# Patient Record
Sex: FEMALE | Race: WHITE | NOT HISPANIC OR LATINO | URBAN - METROPOLITAN AREA
[De-identification: names, ages, dates, MRNs, and addresses within clinical notes are randomized per-mention and may not be internally consistent; named-entity substitution may affect disease eponyms.]

---

## 2018-06-15 ENCOUNTER — OUTPATIENT (OUTPATIENT)
Dept: OUTPATIENT SERVICES | Facility: HOSPITAL | Age: 58
LOS: 1 days | Discharge: HOME | End: 2018-06-15

## 2018-06-15 VITALS
OXYGEN SATURATION: 100 % | SYSTOLIC BLOOD PRESSURE: 121 MMHG | DIASTOLIC BLOOD PRESSURE: 66 MMHG | RESPIRATION RATE: 16 BRPM | TEMPERATURE: 98 F | HEIGHT: 63 IN | WEIGHT: 128.97 LBS | HEART RATE: 73 BPM

## 2018-06-15 DIAGNOSIS — Z01.818 ENCOUNTER FOR OTHER PREPROCEDURAL EXAMINATION: ICD-10-CM

## 2018-06-15 DIAGNOSIS — Z98.890 OTHER SPECIFIED POSTPROCEDURAL STATES: Chronic | ICD-10-CM

## 2018-06-15 DIAGNOSIS — L60.0 INGROWING NAIL: ICD-10-CM

## 2018-06-15 LAB
ALBUMIN SERPL ELPH-MCNC: 4.2 G/DL — SIGNIFICANT CHANGE UP (ref 3.5–5.2)
ALP SERPL-CCNC: 42 U/L — SIGNIFICANT CHANGE UP (ref 30–115)
ALT FLD-CCNC: 14 U/L — SIGNIFICANT CHANGE UP (ref 0–41)
ANION GAP SERPL CALC-SCNC: 13 MMOL/L — SIGNIFICANT CHANGE UP (ref 7–14)
AST SERPL-CCNC: 17 U/L — SIGNIFICANT CHANGE UP (ref 0–41)
BASOPHILS # BLD AUTO: 0.02 K/UL — SIGNIFICANT CHANGE UP (ref 0–0.2)
BASOPHILS NFR BLD AUTO: 0.3 % — SIGNIFICANT CHANGE UP (ref 0–1)
BILIRUB SERPL-MCNC: 0.3 MG/DL — SIGNIFICANT CHANGE UP (ref 0.2–1.2)
BUN SERPL-MCNC: 16 MG/DL — SIGNIFICANT CHANGE UP (ref 10–20)
CALCIUM SERPL-MCNC: 8.6 MG/DL — SIGNIFICANT CHANGE UP (ref 8.5–10.1)
CHLORIDE SERPL-SCNC: 98 MMOL/L — SIGNIFICANT CHANGE UP (ref 98–110)
CO2 SERPL-SCNC: 26 MMOL/L — SIGNIFICANT CHANGE UP (ref 17–32)
CREAT SERPL-MCNC: 0.7 MG/DL — SIGNIFICANT CHANGE UP (ref 0.7–1.5)
EOSINOPHIL # BLD AUTO: 0.02 K/UL — SIGNIFICANT CHANGE UP (ref 0–0.7)
EOSINOPHIL NFR BLD AUTO: 0.3 % — SIGNIFICANT CHANGE UP (ref 0–8)
GLUCOSE SERPL-MCNC: 85 MG/DL — SIGNIFICANT CHANGE UP (ref 70–99)
HCT VFR BLD CALC: 40.2 % — SIGNIFICANT CHANGE UP (ref 37–47)
HGB BLD-MCNC: 13.6 G/DL — SIGNIFICANT CHANGE UP (ref 12–16)
IMM GRANULOCYTES NFR BLD AUTO: 0.3 % — SIGNIFICANT CHANGE UP (ref 0.1–0.3)
LYMPHOCYTES # BLD AUTO: 1.91 K/UL — SIGNIFICANT CHANGE UP (ref 1.2–3.4)
LYMPHOCYTES # BLD AUTO: 27.8 % — SIGNIFICANT CHANGE UP (ref 20.5–51.1)
MCHC RBC-ENTMCNC: 31.1 PG — HIGH (ref 27–31)
MCHC RBC-ENTMCNC: 33.8 G/DL — SIGNIFICANT CHANGE UP (ref 32–37)
MCV RBC AUTO: 92 FL — SIGNIFICANT CHANGE UP (ref 81–99)
MONOCYTES # BLD AUTO: 0.58 K/UL — SIGNIFICANT CHANGE UP (ref 0.1–0.6)
MONOCYTES NFR BLD AUTO: 8.5 % — SIGNIFICANT CHANGE UP (ref 1.7–9.3)
NEUTROPHILS # BLD AUTO: 4.31 K/UL — SIGNIFICANT CHANGE UP (ref 1.4–6.5)
NEUTROPHILS NFR BLD AUTO: 62.8 % — SIGNIFICANT CHANGE UP (ref 42.2–75.2)
NRBC # BLD: 0 /100 WBCS — SIGNIFICANT CHANGE UP (ref 0–0)
PLATELET # BLD AUTO: 173 K/UL — SIGNIFICANT CHANGE UP (ref 130–400)
POTASSIUM SERPL-MCNC: 5.1 MMOL/L — HIGH (ref 3.5–5)
POTASSIUM SERPL-SCNC: 5.1 MMOL/L — HIGH (ref 3.5–5)
PROT SERPL-MCNC: 6.3 G/DL — SIGNIFICANT CHANGE UP (ref 6–8)
RBC # BLD: 4.37 M/UL — SIGNIFICANT CHANGE UP (ref 4.2–5.4)
RBC # FLD: 12.6 % — SIGNIFICANT CHANGE UP (ref 11.5–14.5)
SODIUM SERPL-SCNC: 137 MMOL/L — SIGNIFICANT CHANGE UP (ref 135–146)
WBC # BLD: 6.86 K/UL — SIGNIFICANT CHANGE UP (ref 4.8–10.8)
WBC # FLD AUTO: 6.86 K/UL — SIGNIFICANT CHANGE UP (ref 4.8–10.8)

## 2018-06-15 NOTE — H&P PST ADULT - PMH
Anxiety    Bipolar disorder    Depression  no suicide ideation Anxiety    Arthritis  OA  Bipolar disorder    Depression  no suicide ideation

## 2018-06-15 NOTE — H&P PST ADULT - REASON FOR ADMISSION
painful ingrown toe nails bilateral hallux--pt has for many years--pt requires surgical intervention  pt denies current cp,sob,palp,cough,dysuria   can walk 2 fos and many ,many blocks without sob  pt states this is complete med/surg history

## 2018-06-18 DIAGNOSIS — F41.9 ANXIETY DISORDER, UNSPECIFIED: ICD-10-CM

## 2018-06-18 DIAGNOSIS — F31.9 BIPOLAR DISORDER, UNSPECIFIED: ICD-10-CM

## 2018-06-18 DIAGNOSIS — F32.9 MAJOR DEPRESSIVE DISORDER, SINGLE EPISODE, UNSPECIFIED: ICD-10-CM

## 2018-06-28 ENCOUNTER — OUTPATIENT (OUTPATIENT)
Dept: OUTPATIENT SERVICES | Facility: HOSPITAL | Age: 58
LOS: 1 days | Discharge: HOME | End: 2018-06-28

## 2018-06-28 ENCOUNTER — RESULT REVIEW (OUTPATIENT)
Age: 58
End: 2018-06-28

## 2018-06-28 VITALS
OXYGEN SATURATION: 100 % | DIASTOLIC BLOOD PRESSURE: 57 MMHG | RESPIRATION RATE: 17 BRPM | SYSTOLIC BLOOD PRESSURE: 112 MMHG | HEART RATE: 62 BPM

## 2018-06-28 VITALS
HEIGHT: 63 IN | SYSTOLIC BLOOD PRESSURE: 113 MMHG | DIASTOLIC BLOOD PRESSURE: 58 MMHG | HEART RATE: 64 BPM | TEMPERATURE: 98 F | OXYGEN SATURATION: 100 % | RESPIRATION RATE: 18 BRPM | WEIGHT: 128.97 LBS

## 2018-06-28 DIAGNOSIS — M89.8X7 OTHER SPECIFIED DISORDERS OF BONE, ANKLE AND FOOT: ICD-10-CM

## 2018-06-28 DIAGNOSIS — Z98.890 OTHER SPECIFIED POSTPROCEDURAL STATES: Chronic | ICD-10-CM

## 2018-06-28 DIAGNOSIS — F31.9 BIPOLAR DISORDER, UNSPECIFIED: ICD-10-CM

## 2018-06-28 DIAGNOSIS — L60.0 INGROWING NAIL: ICD-10-CM

## 2018-06-28 DIAGNOSIS — F41.8 OTHER SPECIFIED ANXIETY DISORDERS: ICD-10-CM

## 2018-06-28 RX ORDER — OSPEMIFENE 60 MG/1
1 TABLET, FILM COATED ORAL
Qty: 0 | Refills: 0 | COMMUNITY

## 2018-06-28 RX ORDER — MORPHINE SULFATE 50 MG/1
2 CAPSULE, EXTENDED RELEASE ORAL
Qty: 0 | Refills: 0 | Status: DISCONTINUED | OUTPATIENT
Start: 2018-06-28 | End: 2018-06-28

## 2018-06-28 RX ORDER — DIVALPROEX SODIUM 500 MG/1
1250 TABLET, DELAYED RELEASE ORAL
Qty: 0 | Refills: 0 | COMMUNITY

## 2018-06-28 RX ORDER — L.ACIDOPH/B.ANIMALIS/B.LONGUM 15B CELL
1 CAPSULE ORAL
Qty: 0 | Refills: 0 | COMMUNITY

## 2018-06-28 RX ORDER — LURASIDONE HYDROCHLORIDE 40 MG/1
1 TABLET ORAL
Qty: 0 | Refills: 0 | COMMUNITY

## 2018-06-28 RX ORDER — OXYCODONE AND ACETAMINOPHEN 5; 325 MG/1; MG/1
1 TABLET ORAL ONCE
Qty: 0 | Refills: 0 | Status: DISCONTINUED | OUTPATIENT
Start: 2018-06-28 | End: 2018-06-28

## 2018-06-28 RX ORDER — SODIUM CHLORIDE 9 MG/ML
1000 INJECTION, SOLUTION INTRAVENOUS
Qty: 0 | Refills: 0 | Status: DISCONTINUED | OUTPATIENT
Start: 2018-06-28 | End: 2018-07-13

## 2018-06-28 RX ORDER — CELECOXIB 200 MG/1
1 CAPSULE ORAL
Qty: 0 | Refills: 0 | COMMUNITY

## 2018-06-28 RX ORDER — LINACLOTIDE 145 UG/1
1 CAPSULE, GELATIN COATED ORAL
Qty: 0 | Refills: 0 | COMMUNITY

## 2018-06-28 RX ORDER — CLONAZEPAM 1 MG
1.5 TABLET ORAL
Qty: 0 | Refills: 0 | COMMUNITY

## 2018-06-28 RX ORDER — ONDANSETRON 8 MG/1
4 TABLET, FILM COATED ORAL ONCE
Qty: 0 | Refills: 0 | Status: DISCONTINUED | OUTPATIENT
Start: 2018-06-28 | End: 2018-07-13

## 2018-06-28 RX ADMIN — OXYCODONE AND ACETAMINOPHEN 1 TABLET(S): 5; 325 TABLET ORAL at 09:20

## 2018-06-28 RX ADMIN — SODIUM CHLORIDE 100 MILLILITER(S): 9 INJECTION, SOLUTION INTRAVENOUS at 08:45

## 2018-06-28 NOTE — PROGRESS NOTE ADULT - SUBJECTIVE AND OBJECTIVE BOX
Podiatry Pre-Operative Note   [id number] DOV KILLIAN is a 58y year old Female patient presenting to the operating room for surgical management of the b/l feet. The patient has failed all conservative measures and requires surgical intervention at this time.    PAST MEDICAL & SURGICAL HISTORY:  Arthritis: OA  Bipolar disorder  Depression: no suicide ideation  Anxiety  History of surgery: foot surgery age 39&#x27;s      Medications:     CeleBREX 200 mg oral capsule: 1 cap(s) orally 2 times a day, As Needed  clonazePAM: 1.5 milligram(s) orally once a day (at bedtime)  Depakote: 1250 milligram(s) orally once a day (at bedtime)  Latuda 20 mg oral tablet: 1 tab(s) orally once a day (at bedtime)  Linzess 145 mcg oral capsule: 1 cap(s) orally once a day  multivitamin: orally once a day, 1 tab  Osphena 60 mg oral tablet: 1 tab(s) orally once a day  Probiotic Formula oral capsule: 1 cap(s) orally once a day    Allergies    penicillin (Other)    Intolerances        Consent [done]  H&P [done]  Medical Clearance [PAST_]    Surgeon: Dr. Husain DPM  Assistant (s): Dr. Daniel SCHULTZ  Pre Operative Diagnosis: Painful ingrown 1st toenail b/l  Planned Procedure: Nail avulsion of the 1st toenail b/l with matrixectomy.    The patient has been educated on the above procedure, with all risks and benefits described. No guarantees were given or implied for the aforementioned procedure.  The above assistant(s) have introduced themselves to the patient. The patient consents to their participation in the procedure listed above.   06-28-18 @ 07:14

## 2018-06-28 NOTE — PRE-ANESTHESIA EVALUATION ADULT - NSANTHOSAYNRD_GEN_A_CORE
see tool/No. LAINEY screening performed.  STOP BANG Legend: 0-2 = LOW Risk; 3-4 = INTERMEDIATE Risk; 5-8 = HIGH Risk

## 2018-06-28 NOTE — BRIEF OPERATIVE NOTE - PRE-OP DX
Ingrown left big toenail  06/28/2018  painful left hallux with ingrown nail with exostosis  Active  Ed Sanchez  Ingrown right big toenail  06/28/2018  painful right hallux with ingrown nail with exostosis  Active  Ed Sanchez

## 2018-06-28 NOTE — BRIEF OPERATIVE NOTE - PROCEDURE
<<-----Click on this checkbox to enter Procedure Excision, nail and nail matrix, permanent, for ingrown nail  06/28/2018  painful b/l hallux with chronic ingrown including cutting soft tissue and bone, bilateral feet  Active  [id number]

## 2018-06-28 NOTE — BRIEF OPERATIVE NOTE - POST-OP DX
Ingrown left big toenail  06/28/2018  painful left hallux with ingrown toenails with exostosis of matrixectomies, soft tisse and bone  Active  Ed Sanchez  Ingrown right big toenail  06/28/2018  painful right hallux with ingrown toenails with exostosis of matrixectomies, soft tisse and bone  Active  Ed Sanchez

## 2018-06-29 LAB — SURGICAL PATHOLOGY STUDY: SIGNIFICANT CHANGE UP

## 2018-07-17 PROBLEM — F32.9 MAJOR DEPRESSIVE DISORDER, SINGLE EPISODE, UNSPECIFIED: Chronic | Status: ACTIVE | Noted: 2018-06-15

## 2022-07-05 PROBLEM — F41.9 ANXIETY DISORDER, UNSPECIFIED: Chronic | Status: ACTIVE | Noted: 2018-06-15

## 2022-07-05 PROBLEM — F31.9 BIPOLAR DISORDER, UNSPECIFIED: Chronic | Status: ACTIVE | Noted: 2018-06-15

## 2022-07-05 PROBLEM — M19.90 UNSPECIFIED OSTEOARTHRITIS, UNSPECIFIED SITE: Chronic | Status: ACTIVE | Noted: 2018-06-15

## 2022-08-02 PROBLEM — Z00.00 ENCOUNTER FOR PREVENTIVE HEALTH EXAMINATION: Status: ACTIVE | Noted: 2022-08-02

## 2022-08-03 ENCOUNTER — APPOINTMENT (OUTPATIENT)
Dept: NEUROLOGY | Facility: CLINIC | Age: 62
End: 2022-08-03

## 2022-10-11 ENCOUNTER — NON-APPOINTMENT (OUTPATIENT)
Age: 62
End: 2022-10-11

## 2023-10-05 NOTE — BRIEF OPERATIVE NOTE - SURGICAL START DATE/TIME
Problem: Adult Inpatient Plan of Care  Goal: Plan of Care Review  Outcome: Ongoing, Progressing  Goal: Patient-Specific Goal (Individualized)  Outcome: Ongoing, Progressing  Goal: Absence of Hospital-Acquired Illness or Injury  Outcome: Ongoing, Progressing  Goal: Optimal Comfort and Wellbeing  Outcome: Ongoing, Progressing  Goal: Readiness for Transition of Care  Outcome: Ongoing, Progressing     Problem: Diabetes Comorbidity  Goal: Blood Glucose Level Within Targeted Range  Outcome: Ongoing, Progressing     Problem: Skin Injury Risk Increased  Goal: Skin Health and Integrity  Outcome: Ongoing, Progressing     Problem: Impaired Wound Healing  Goal: Optimal Wound Healing  Outcome: Ongoing, Progressing     Problem: Fall Injury Risk  Goal: Absence of Fall and Fall-Related Injury  Outcome: Ongoing, Progressing     Problem: Adjustment to Illness (Sepsis/Septic Shock)  Goal: Optimal Coping  Outcome: Ongoing, Progressing     Problem: Bleeding (Sepsis/Septic Shock)  Goal: Absence of Bleeding  Outcome: Ongoing, Progressing     Problem: Glycemic Control Impaired (Sepsis/Septic Shock)  Goal: Blood Glucose Level Within Desired Range  Outcome: Ongoing, Progressing     Problem: Infection Progression (Sepsis/Septic Shock)  Goal: Absence of Infection Signs and Symptoms  Outcome: Ongoing, Progressing     Problem: Nutrition Impaired (Sepsis/Septic Shock)  Goal: Optimal Nutrition Intake  Outcome: Ongoing, Progressing      28-Jun-2018 08:57

## 2025-07-14 ENCOUNTER — OUTPATIENT (OUTPATIENT)
Dept: OUTPATIENT SERVICES | Facility: HOSPITAL | Age: 65
LOS: 1 days | End: 2025-07-14
Payer: MEDICARE

## 2025-07-14 ENCOUNTER — RESULT REVIEW (OUTPATIENT)
Age: 65
End: 2025-07-14

## 2025-07-14 ENCOUNTER — APPOINTMENT (OUTPATIENT)
Dept: NEUROSURGERY | Facility: CLINIC | Age: 65
End: 2025-07-14
Payer: MEDICARE

## 2025-07-14 DIAGNOSIS — M47.816 SPONDYLOSIS WITHOUT MYELOPATHY OR RADICULOPATHY, LUMBAR REGION: ICD-10-CM

## 2025-07-14 DIAGNOSIS — Z98.890 OTHER SPECIFIED POSTPROCEDURAL STATES: Chronic | ICD-10-CM

## 2025-07-14 PROBLEM — M19.90 ARTHRITIS: Status: ACTIVE | Noted: 2025-07-14

## 2025-07-14 PROBLEM — M46.1 BILATERAL SACROILIITIS: Status: ACTIVE | Noted: 2025-07-14

## 2025-07-14 PROBLEM — F32.A DEPRESSION: Status: ACTIVE | Noted: 2025-07-14

## 2025-07-14 PROCEDURE — 72170 X-RAY EXAM OF PELVIS: CPT

## 2025-07-14 PROCEDURE — 72170 X-RAY EXAM OF PELVIS: CPT | Mod: 26

## 2025-07-14 PROCEDURE — 72110 X-RAY EXAM L-2 SPINE 4/>VWS: CPT

## 2025-07-14 PROCEDURE — 72110 X-RAY EXAM L-2 SPINE 4/>VWS: CPT | Mod: 26

## 2025-07-14 PROCEDURE — 99203 OFFICE O/P NEW LOW 30 MIN: CPT

## 2025-07-14 RX ORDER — NALOXEGOL OXALATE 25 MG/1
TABLET, FILM COATED ORAL
Refills: 0 | Status: ACTIVE | COMMUNITY

## 2025-07-14 RX ORDER — PHENAZOPYRIDINE HYDROCHLORIDE 100 MG/1
60 TABLET, COATED ORAL
Refills: 0 | Status: ACTIVE | COMMUNITY

## 2025-07-14 RX ORDER — HYDROCODONE BITARTRATE AND ACETAMINOPHEN 5; 300 MG/1; MG/1
TABLET ORAL
Refills: 0 | Status: ACTIVE | COMMUNITY

## 2025-07-15 DIAGNOSIS — M47.816 SPONDYLOSIS WITHOUT MYELOPATHY OR RADICULOPATHY, LUMBAR REGION: ICD-10-CM

## 2025-07-21 ENCOUNTER — OUTPATIENT (OUTPATIENT)
Dept: OUTPATIENT SERVICES | Facility: HOSPITAL | Age: 65
LOS: 1 days | Discharge: HOME | End: 2025-07-21
Payer: MEDICARE

## 2025-07-21 DIAGNOSIS — M47.816 SPONDYLOSIS WITHOUT MYELOPATHY OR RADICULOPATHY, LUMBAR REGION: ICD-10-CM

## 2025-07-21 DIAGNOSIS — Z98.890 OTHER SPECIFIED POSTPROCEDURAL STATES: Chronic | ICD-10-CM

## 2025-07-21 PROCEDURE — 27096 INJECT SACROILIAC JOINT: CPT | Mod: RT

## 2025-07-21 PROCEDURE — G0260: CPT | Mod: LT

## 2025-07-21 NOTE — ASU DISCHARGE PLAN (ADULT/PEDIATRIC) - FINANCIAL ASSISTANCE
[facility] provides services at a reduced cost to those who are determined to be eligible through [facility]’s financial assistance program. Information regarding [facility]’s financial assistance program can be found by going to [email or web address] or by calling [phone].

## 2025-07-21 NOTE — H&P ADULT - NSHPPHYSICALEXAM_GEN_ALL_CORE
Constitutional Well-developed, well nourished  - Eyes EOMI; PERRL; no drainage or redness  - ENMT No oral lesions; no gross abnormalities   - Respiratory Breath Sounds equal & clear to percussion & auscultation, no accessory muscle use  - Cardiovascular Regular rate & rhythm, normal S1, S2; no murmurs, gallops or rubs; no S3, S4  - Gastrointestinal Soft, non-tender, no hepatosplenomegaly, normal bowel sounds  - Neurological Alert & oriented; no sensory, motor or coordination deficits, normal reflexes  - Skin No lesions; no rash  - Lymph Nodes No lymphoadenopathy  - Musculoskeletal No joint pain, swelling or deformity; no limitation of  movement  - Psychiatric Affect and characteristics of appearance, verbalizations,  behaviors are appropriate

## 2025-07-21 NOTE — H&P ADULT - ASSESSMENT
65 yr old female with PMH anxiety, arthritis, bipolar disorder and depression presents to IR for bilateral therapeutic SI joint injections

## 2025-07-21 NOTE — PROGRESS NOTE ADULT - SUBJECTIVE AND OBJECTIVE BOX
INTERVENTIONAL RADIOLOGY BRIEF-OPERATIVE NOTE    Procedure:  CT-directed bilateral sacroiliac joint injections    Pre-Op Diagnosis:  Bilateral sacroiliitis    Post-Op Diagnosis:  Same    Attending:  Marilyn  Resident:  None    Anesthesia (type):  [ ] General Anesthesia  [ ] Sedation  [ ] Spinal Anesthesia  [X] Local/Regional -- 1% Lidocaine, SQ posterior pelvis, bilaterally, 0.5 cc    Other:  Sensorcaine, 0.25%, 2.5 cc to each joint and Kenalog, 40 mg (1 cc) to each joint    Contrast:  None    Estimated Blood Loss:  0 cc    Condition:   [ ] Critical  [ ] Serious  [ ] Fair   [X] Good    Findings/Follow up Plan of Care:  Bilateral SI joints accessed and injected using Sensorcaine and Kenalog with a 20G, 3.5" BD needle under CT-guidance.  On entry to each joint the patient reported 1/10 discomfort which resolved after manipulation of the needle.  She tolerated well otherwise.    Specimens Removed:  None    Implants:  None    Complications:  None immediate    Disposition:  F/U Dr. Najera.      Please call Interventional Radiology [phone] with any questions, concerns, or issues.